# Patient Record
Sex: FEMALE | URBAN - METROPOLITAN AREA
[De-identification: names, ages, dates, MRNs, and addresses within clinical notes are randomized per-mention and may not be internally consistent; named-entity substitution may affect disease eponyms.]

---

## 2020-05-20 ENCOUNTER — NURSE TRIAGE (OUTPATIENT)
Dept: NURSING | Facility: CLINIC | Age: 30
End: 2020-05-20

## 2020-05-20 NOTE — TELEPHONE ENCOUNTER
S: Suicide thoughts.  B: Called crying and wanting to know where she could get an .   Stated she was about 5 months pregnant and has not had any prenatal care.  Lives with the partner of her baby.  She tells writer about her mood swings.  She relats these mood swings to the hormonal  changes due to being pregnant. Also admits these moods swings are more than usual. On   was beating self on stomach because she felft frustrated. So then went out for a walk.  She sat down on a train track hoping a train would come by a run her over.  She left the train tracks when her partner came looking for her.  Once when she was 20 tried to overdose on medications in the morning woke up with a HA.   She has never been treated for depression.  She has felt this way her whole life.  Not really connected with any of her family.  States her partner is not hurting her.    A: In the past was a patient of the [facility], was given this phone number.  Was given phone number to [facility].  Phone number to [facility].  Was willing to take a ride to [facility] for help.   R: 911 call for wellness check and transport to the ED.  Krista Mak RN, [facility]    Additional Information    Negative: Patient attempted suicide    Negative: Patient is threatening suicide now    Negative: Violent behavior, or threatening to physically hurt or kill someone    Negative: [1] Patient is very confused (disoriented, slurred speech) AND [2] no other adult (e.g., friend or family member) available    Negative: [1] Difficult to awaken or acting very confused (disoriented, slurred speech) AND [2] new onset    Negative: Sounds like a life-threatening emergency to the triager    Protocols used: SUICIDE [id number]

## 2020-05-21 NOTE — TELEPHONE ENCOUNTER
Reason for Disposition    Patient sounds very sick or weak to the triager    Protocols used: SUICIDE [id number]